# Patient Record
Sex: FEMALE | Race: WHITE | HISPANIC OR LATINO
[De-identification: names, ages, dates, MRNs, and addresses within clinical notes are randomized per-mention and may not be internally consistent; named-entity substitution may affect disease eponyms.]

---

## 2017-02-15 ENCOUNTER — TRANSCRIPTION ENCOUNTER (OUTPATIENT)
Age: 52
End: 2017-02-15

## 2022-11-11 ENCOUNTER — APPOINTMENT (OUTPATIENT)
Dept: ORTHOPEDIC SURGERY | Facility: CLINIC | Age: 57
End: 2022-11-11

## 2022-11-11 VITALS — WEIGHT: 97 LBS | HEIGHT: 57 IN | BODY MASS INDEX: 20.93 KG/M2

## 2022-11-11 DIAGNOSIS — M75.32 CALCIFIC TENDINITIS OF LEFT SHOULDER: ICD-10-CM

## 2022-11-11 PROCEDURE — 20611 DRAIN/INJ JOINT/BURSA W/US: CPT | Mod: LT

## 2022-11-11 PROCEDURE — 99214 OFFICE O/P EST MOD 30 MIN: CPT | Mod: 25

## 2022-11-11 PROCEDURE — 73010 X-RAY EXAM OF SHOULDER BLADE: CPT | Mod: LT

## 2022-11-11 PROCEDURE — J3490M: CUSTOM

## 2022-11-11 PROCEDURE — 73030 X-RAY EXAM OF SHOULDER: CPT | Mod: LT

## 2022-11-11 NOTE — HISTORY OF PRESENT ILLNESS
[7] : 7 [5] : 5 [Dull/Aching] : dull/aching [Intermittent] : intermittent [Nothing helps with pain getting better] : Nothing helps with pain getting better [] : no [FreeTextEntry1] : LT shoulder

## 2022-11-11 NOTE — PROCEDURE
[FreeTextEntry3] : - Large joint injection was performed of the LEFT subacromial space. \par - The indication for this procedure was pain and inflammation. Patient has tried OTC's including aspirin, Ibuprofen, Aleve, etc or prescription NSAIDS, and/or exercises at home and/or physical therapy without satisfactory response, patient had decreased mobility in the joint and the risks benefits, and alternatives have been discussed, and verbal consent was obtained. The site was prepped with alcohol, betadine, ethyl chloride sprayed topically and sterile technique used. \par - An injection of Betamethasone 2cc; Marcaine 5cc injected.\par - Patient was advised to call if redness, pain or fever occur, apply ice for 15 minutes out of every hour for the next 12-24 hours as tolerated and patient was advised to rest the joint(s) for 2 days. \par - Ultrasound guidance was indicated for this patient due to prior failure or difficult injection. All ultrasound images have been permanently captured and stored accordingly in our picture archiving and communication system. Visualization of the needle and placement of injection was performed without complication.\par

## 2022-11-11 NOTE — IMAGING
[de-identified] : No swelling, no ecchymosis, no rupert deformity\par Tenderness to palpation over greater tuberosity\par No instability or tenderness over AC joint\par Full range of motion with pain\par 5/5 supraspinatus, infraspinatus and subscapularis; there is pain with strength testing\par Positive Alberts test, positive impingement sign\par Speed’s and yergason negative\par Motor and sensory intact distally\par  [Left] : left shoulder [Calcific density] : Calcific density [There are no fractures, subluxations or dislocations. No significant abnormalities are seen] : There are no fractures, subluxations or dislocations. No significant abnormalities are seen

## 2022-11-11 NOTE — ASSESSMENT
[FreeTextEntry1] : RECURRENCE OF CALCIFIC TENDINITIS, EXCELLENT RELIEF WITH CSI 2 YRS AGO\par REPEAT TODAY\par R/B/A REVIEWED

## 2023-11-20 ENCOUNTER — OFFICE (OUTPATIENT)
Dept: URBAN - METROPOLITAN AREA CLINIC 109 | Facility: CLINIC | Age: 58
Setting detail: OPHTHALMOLOGY
End: 2023-11-20
Payer: COMMERCIAL

## 2023-11-20 DIAGNOSIS — E11.9: ICD-10-CM

## 2023-11-20 DIAGNOSIS — H40.013: ICD-10-CM

## 2023-11-20 DIAGNOSIS — H25.13: ICD-10-CM

## 2023-11-20 PROCEDURE — 92020 GONIOSCOPY: CPT | Performed by: OPHTHALMOLOGY

## 2023-11-20 PROCEDURE — 92014 COMPRE OPH EXAM EST PT 1/>: CPT | Performed by: OPHTHALMOLOGY

## 2023-11-20 PROCEDURE — 92133 CPTRZD OPH DX IMG PST SGM ON: CPT | Performed by: OPHTHALMOLOGY

## 2023-11-20 ASSESSMENT — REFRACTION_AUTOREFRACTION
OD_AXIS: 91
OS_SPHERE: +0.50
OD_CYLINDER: -0.50
OS_AXIS: 96
OD_SPHERE: +0.25
OS_CYLINDER: -1.00

## 2023-11-20 ASSESSMENT — SPHEQUIV_DERIVED
OD_SPHEQUIV: 0
OS_SPHEQUIV: 0

## 2023-11-20 ASSESSMENT — CONFRONTATIONAL VISUAL FIELD TEST (CVF)
OD_FINDINGS: FULL
OS_FINDINGS: FULL

## 2023-11-20 ASSESSMENT — REFRACTION_MANIFEST
OD_ADD: +1.75
OD_SPHERE: +2.25
OS_ADD: +1.75
OS_SPHERE: +2.25
OS_SPHERE: -0.25
OS_VA1: 20/20
OD_SPHERE: -0.50
OD_VA1: 20/20

## 2024-01-17 ENCOUNTER — NON-APPOINTMENT (OUTPATIENT)
Age: 59
End: 2024-01-17

## 2024-01-17 ENCOUNTER — APPOINTMENT (OUTPATIENT)
Dept: ORTHOPEDIC SURGERY | Facility: CLINIC | Age: 59
End: 2024-01-17
Payer: COMMERCIAL

## 2024-01-17 VITALS — WEIGHT: 95 LBS | BODY MASS INDEX: 20.49 KG/M2 | HEIGHT: 57 IN

## 2024-01-17 DIAGNOSIS — S82.62XA DISPLACED FRACTURE OF LATERAL MALLEOLUS OF LEFT FIBULA, INITIAL ENCOUNTER FOR CLOSED FRACTURE: ICD-10-CM

## 2024-01-17 DIAGNOSIS — E11.9 TYPE 2 DIABETES MELLITUS W/OUT COMPLICATIONS: ICD-10-CM

## 2024-01-17 PROCEDURE — 99214 OFFICE O/P EST MOD 30 MIN: CPT | Mod: 25

## 2024-01-17 PROCEDURE — L4350: CPT | Mod: LT

## 2024-01-17 PROCEDURE — 27786 TREATMENT OF ANKLE FRACTURE: CPT

## 2024-01-17 NOTE — PHYSICAL EXAM
[Left] : left foot and ankle [5___] : plantar flexion 5[unfilled]/5 [2+] : dorsalis pedis pulse: 2+ [] : patient ambulates without assistive device [de-identified] : plantar flexion 30 degrees [TWNoteComboBox7] : dorsiflexion 10 degrees

## 2024-01-17 NOTE — HISTORY OF PRESENT ILLNESS
[Sharp] : sharp [de-identified] : 01/17/2024: fall 3 days ago w/ ankle pain. went to urgent care and had xrays done. walking in regular shoes. no prior ankle probs. +DM (a1c 7.4). no tob. dietary aid [] : Post Surgical Visit: no [FreeTextEntry1] : LT ankle  [FreeTextEntry3] : 1/14/24

## 2024-01-17 NOTE — DATA REVIEWED
[Outside X-rays] : outside x-rays [Left] : left [Ankle] : ankle [I reviewed the films/CD and additionally noted] : I reviewed the films/CD and additionally noted [FreeTextEntry1] : lat mall avulsion fx

## 2024-01-31 ENCOUNTER — NON-APPOINTMENT (OUTPATIENT)
Age: 59
End: 2024-01-31

## 2024-01-31 ENCOUNTER — APPOINTMENT (OUTPATIENT)
Dept: ORTHOPEDIC SURGERY | Facility: CLINIC | Age: 59
End: 2024-01-31
Payer: COMMERCIAL

## 2024-01-31 VITALS — WEIGHT: 95 LBS | BODY MASS INDEX: 20.49 KG/M2 | HEIGHT: 57 IN

## 2024-01-31 PROCEDURE — 73610 X-RAY EXAM OF ANKLE: CPT | Mod: LT

## 2024-01-31 PROCEDURE — 99024 POSTOP FOLLOW-UP VISIT: CPT

## 2024-01-31 NOTE — HISTORY OF PRESENT ILLNESS
[Sharp] : sharp [de-identified] : 01/17/2024: fall 3 days ago w/ ankle pain. went to urgent care and had xrays done. walking in regular shoes. no prior ankle probs. +DM (a1c 7.4). no tob. dietary aid  01/31/2024: no sig change. walking w/ brace and cane [] : Post Surgical Visit: no [FreeTextEntry1] : LT ankle  [FreeTextEntry3] : 1/14/24

## 2024-01-31 NOTE — PHYSICAL EXAM
[Left] : left foot and ankle [5___] : plantar flexion 5[unfilled]/5 [2+] : dorsalis pedis pulse: 2+ [] : ambulation with cane [de-identified] : plantar flexion 30 degrees [TWNoteComboBox7] : dorsiflexion 10 degrees

## 2024-02-14 ENCOUNTER — NON-APPOINTMENT (OUTPATIENT)
Age: 59
End: 2024-02-14

## 2024-02-14 ENCOUNTER — APPOINTMENT (OUTPATIENT)
Dept: ORTHOPEDIC SURGERY | Facility: CLINIC | Age: 59
End: 2024-02-14
Payer: COMMERCIAL

## 2024-02-14 VITALS — WEIGHT: 95 LBS | HEIGHT: 57 IN | BODY MASS INDEX: 20.49 KG/M2

## 2024-02-14 PROCEDURE — 73610 X-RAY EXAM OF ANKLE: CPT | Mod: LT

## 2024-02-14 PROCEDURE — 99024 POSTOP FOLLOW-UP VISIT: CPT

## 2024-02-14 NOTE — PHYSICAL EXAM
[Left] : left foot and ankle [5___] : plantar flexion 5[unfilled]/5 [2+] : dorsalis pedis pulse: 2+ [] : no achilles tendon tenderness [FreeTextEntry8] : improved [de-identified] : plantar flexion 30 degrees [TWNoteComboBox7] : dorsiflexion 10 degrees

## 2024-02-14 NOTE — ASSESSMENT
[FreeTextEntry1] : [employer] ice/elevate nsaids prn PT f/up 3 wks w/ ankle xray and eval for return to work

## 2024-02-14 NOTE — HISTORY OF PRESENT ILLNESS
[Sharp] : sharp [de-identified] : 01/17/2024: fall 3 days ago w/ ankle pain. went to urgent care and had xrays done. walking in regular shoes. no prior ankle probs. +DM (a1c 7.4). no tob. dietary aid  01/31/2024: no sig change. walking w/ brace and cane  02/14/2024: no sig change. walking w/ brace.  [] : Post Surgical Visit: no [FreeTextEntry1] : LT ankle  [FreeTextEntry3] : 1/14/24

## 2024-03-06 ENCOUNTER — NON-APPOINTMENT (OUTPATIENT)
Age: 59
End: 2024-03-06

## 2024-03-06 ENCOUNTER — APPOINTMENT (OUTPATIENT)
Dept: ORTHOPEDIC SURGERY | Facility: CLINIC | Age: 59
End: 2024-03-06
Payer: COMMERCIAL

## 2024-03-06 VITALS — BODY MASS INDEX: 20.49 KG/M2 | HEIGHT: 57 IN | WEIGHT: 95 LBS

## 2024-03-06 DIAGNOSIS — Z00.00 ENCOUNTER FOR GENERAL ADULT MEDICAL EXAMINATION W/OUT ABNORMAL FINDINGS: ICD-10-CM

## 2024-03-06 DIAGNOSIS — S82.62XD DISPLACED FRACTURE OF LATERAL MALLEOLUS OF LEFT FIBULA, SUBSEQUENT ENCOUNTER FOR CLOSED FRACTURE WITH ROUTINE HEALING: ICD-10-CM

## 2024-03-06 PROCEDURE — 99024 POSTOP FOLLOW-UP VISIT: CPT

## 2024-03-06 PROCEDURE — 73610 X-RAY EXAM OF ANKLE: CPT | Mod: LT

## 2024-03-06 NOTE — HISTORY OF PRESENT ILLNESS
[Sharp] : sharp [de-identified] : 01/17/2024: fall 3 days ago w/ ankle pain. went to urgent care and had xrays done. walking in regular shoes. no prior ankle probs. +DM (a1c 7.4). no tob. dietary aid  01/31/2024: no sig change. walking w/ brace and cane  02/14/2024: no sig change. walking w/ brace.   03/06/2024: improving. walking in regular shoes. out of work. going to PT [] : Post Surgical Visit: no [FreeTextEntry1] : LT ankle  [FreeTextEntry3] : 1/14/24

## 2024-03-06 NOTE — PHYSICAL EXAM
[Left] : left foot and ankle [5___] : plantar flexion 5[unfilled]/5 [2+] : dorsalis pedis pulse: 2+ [NL (40)] : plantar flexion 40 degrees [] : patient ambulates without assistive device [FreeTextEntry8] : improved [TWNoteComboBox7] : dorsiflexion 15 degrees